# Patient Record
Sex: FEMALE | Race: WHITE | NOT HISPANIC OR LATINO | Employment: OTHER | URBAN - METROPOLITAN AREA
[De-identification: names, ages, dates, MRNs, and addresses within clinical notes are randomized per-mention and may not be internally consistent; named-entity substitution may affect disease eponyms.]

---

## 2017-08-03 ENCOUNTER — PATIENT OUTREACH (OUTPATIENT)
Dept: ADMINISTRATIVE | Facility: HOSPITAL | Age: 52
End: 2017-08-03

## 2023-06-21 ENCOUNTER — TELEPHONE (OUTPATIENT)
Dept: PODIATRY | Facility: CLINIC | Age: 58
End: 2023-06-21
Payer: COMMERCIAL

## 2023-06-21 NOTE — TELEPHONE ENCOUNTER
[facility] requesting callback  ----- Message from Cecille Bernardo sent at 6/21/2023  8:42 AM CDT -----  Contact: Cherie  Type:  Sooner Apoointment Request    Caller is requesting a sooner appointment.  Caller declined first available appointment listed below.  Caller will not accept being placed on the waitlist and is requesting a message be sent to doctor.  Name of Caller:Cherie  When is the first available appointment?everything has a time conflict on it  Symptoms:Foot pain  Would the patient rather a call back or a response via [facility]? Call back  Best Call Back Number:[phone]  Additional Information:     Thanks  LJ

## 2023-07-06 ENCOUNTER — OFFICE VISIT (OUTPATIENT)
Dept: PODIATRY | Facility: CLINIC | Age: 58
End: 2023-07-06
Payer: COMMERCIAL

## 2023-07-06 ENCOUNTER — HOSPITAL ENCOUNTER (OUTPATIENT)
Dept: RADIOLOGY | Facility: HOSPITAL | Age: 58
Discharge: HOME | End: 2023-07-06
Attending: PODIATRIST
Payer: COMMERCIAL

## 2023-07-06 VITALS — HEIGHT: 65 IN | WEIGHT: 192.69 LBS | BODY MASS INDEX: 32.1 KG/M2

## 2023-07-06 DIAGNOSIS — G57.92 PERIPHERAL NEURITIS OF LEFT FOOT: ICD-10-CM

## 2023-07-06 DIAGNOSIS — I83.93 ASYMPTOMATIC VARICOSE VEINS OF BOTH LOWER EXTREMITIES: ICD-10-CM

## 2023-07-06 DIAGNOSIS — M25.572 PAIN IN LEFT ANKLE AND JOINTS OF LEFT FOOT: ICD-10-CM

## 2023-07-06 DIAGNOSIS — M19.072 OSTEOARTHRITIS OF LEFT ANKLE OR FOOT: Primary | ICD-10-CM

## 2023-07-06 DIAGNOSIS — I87.2 VENOUS INSUFFICIENCY OF BOTH LOWER EXTREMITIES: ICD-10-CM

## 2023-07-06 DIAGNOSIS — M77.41 METATARSALGIA, RIGHT FOOT: ICD-10-CM

## 2023-07-06 DIAGNOSIS — M25.571 PAIN IN RIGHT ANKLE AND JOINTS OF RIGHT FOOT: ICD-10-CM

## 2023-07-06 DIAGNOSIS — G57.91 PERIPHERAL NEURITIS OF RIGHT FOOT: ICD-10-CM

## 2023-07-06 DIAGNOSIS — M77.42 METATARSALGIA, LEFT FOOT: ICD-10-CM

## 2023-07-06 DIAGNOSIS — Z79.01 ON CONTINUOUS ORAL ANTICOAGULATION: ICD-10-CM

## 2023-07-06 DIAGNOSIS — M19.071 OSTEOARTHRITIS OF RIGHT ANKLE OR FOOT: ICD-10-CM

## 2023-07-06 DIAGNOSIS — M19.072 OSTEOARTHRITIS OF LEFT ANKLE OR FOOT: ICD-10-CM

## 2023-07-06 PROCEDURE — 99204 PR OFFICE/OUTPT VISIT, NEW, LEVL IV, 45-59 MIN: ICD-10-PCS | Mod: S$GLB,,, | Performed by: PODIATRIST

## 2023-07-06 PROCEDURE — 3008F BODY MASS INDEX DOCD: CPT | Mod: CPTII,S$GLB,, | Performed by: PODIATRIST

## 2023-07-06 PROCEDURE — 3008F PR BODY MASS INDEX (BMI) DOCUMENTED: ICD-10-PCS | Mod: CPTII,S$GLB,, | Performed by: PODIATRIST

## 2023-07-06 PROCEDURE — 99999 PR PBB SHADOW E&M-EST. PATIENT-LVL III: ICD-10-PCS | Mod: PBBFAC,,, | Performed by: PODIATRIST

## 2023-07-06 PROCEDURE — 73630 XR FOOT COMPLETE 3 VIEW BILATERAL: ICD-10-PCS | Mod: 26,,, | Performed by: RADIOLOGY

## 2023-07-06 PROCEDURE — 73630 X-RAY EXAM OF FOOT: CPT | Mod: 26,,, | Performed by: RADIOLOGY

## 2023-07-06 PROCEDURE — 1160F PR REVIEW ALL MEDS BY PRESCRIBER/CLIN PHARMACIST DOCUMENTED: ICD-10-PCS | Mod: CPTII,S$GLB,, | Performed by: PODIATRIST

## 2023-07-06 PROCEDURE — 99204 OFFICE O/P NEW MOD 45 MIN: CPT | Mod: S$GLB,,, | Performed by: PODIATRIST

## 2023-07-06 PROCEDURE — 73630 X-RAY EXAM OF FOOT: CPT | Mod: TC,50

## 2023-07-06 PROCEDURE — 1160F RVW MEDS BY RX/DR IN RCRD: CPT | Mod: CPTII,S$GLB,, | Performed by: PODIATRIST

## 2023-07-06 PROCEDURE — 1159F PR MEDICATION LIST DOCUMENTED IN MEDICAL RECORD: ICD-10-PCS | Mod: CPTII,S$GLB,, | Performed by: PODIATRIST

## 2023-07-06 PROCEDURE — 99999 PR PBB SHADOW E&M-EST. PATIENT-LVL III: CPT | Mod: PBBFAC,,, | Performed by: PODIATRIST

## 2023-07-06 PROCEDURE — 1159F MED LIST DOCD IN RCRD: CPT | Mod: CPTII,S$GLB,, | Performed by: PODIATRIST

## 2023-07-06 RX ORDER — APIXABAN 5 MG/1
5 TABLET, FILM COATED ORAL 2 TIMES DAILY
COMMUNITY
Start: 2023-06-07

## 2023-07-06 RX ORDER — DEXTROAMPHETAMINE SACCHARATE, AMPHETAMINE ASPARTATE, DEXTROAMPHETAMINE SULFATE AND AMPHETAMINE SULFATE 3.75; 3.75; 3.75; 3.75 MG/1; MG/1; MG/1; MG/1
15 TABLET ORAL
COMMUNITY
Start: 2022-11-21

## 2023-07-06 RX ORDER — PREGABALIN 50 MG/1
50 CAPSULE ORAL 2 TIMES DAILY
Qty: 60 CAPSULE | Refills: 0 | Status: SHIPPED | OUTPATIENT
Start: 2023-07-06 | End: 2023-08-05

## 2023-07-06 NOTE — PROGRESS NOTES
Subjective:       Patient ID: Cherie Eduardo is a 57 y.o. female.    Chief Complaint: Foot Injury (Pt c/o BL foot pain 7/10 when walking,non-diabetic pt wears tennis shoes PCP Dr. Maxwell last seen 10-6-21) and Foot Pain      HPI: Cherie Eduardo presents to the clinic today for evaluation concerning stated moderate pains to the left and right foot/ankle at the midfoot and ankle. Patient states pains are approx. 7/10. Pains are described as achy and sharp. Pains have been present for duration of months/years. Patient states the pains are exacerbated with walking and standing and prolonged activities. States occasional NSAIDs. Trauma is not stated. Patient's Primary Care Provider is Chucky Herrera MD. States burning of the LE.     Review of patient's allergies indicates:   Allergen Reactions    Gabapentin      Rash       Past Medical History:   Diagnosis Date    CKD (chronic kidney disease) stage 3, GFR 30-59 ml/min     DVT (deep venous thrombosis)     left leg    DVT (deep venous thrombosis)     Hearing loss and ringing in ears     Hematoma of lower limb 01/15/2012    1/17/12 Surgery on Left Leg Hematoma by Dr. Aguilar at [facility]    History of blood clots     surgery for hematoma from blood clots in left leg    Hypertension     Orbital fracture     Renal failure, acute     Scoliosis     Was unable to walk due to condition..Surgery performed at [facility], [facility]. by Dr. Rik Wiseman.  10th rib removed for bone graft.Fusion of T-11 - T-12, T-12-L1, L1- L2, L2- L3, L3 - L4, L4- L5    Seizures     Sepsis     Shoulder contusion     Treated at [facility]  by Dr. Austyn Salter III,    Unspecified essential hypertension        Family History   Problem Relation Age of Onset    Heart failure Father     Cancer Mother        Social History     Socioeconomic History    Marital status:    Occupational History    Occupation: Retired   Tobacco Use    Smoking status: Former      "Types: Cigarettes    Smokeless tobacco: Never   Substance and Sexual Activity    Alcohol use: Yes     Comment: rarely    Drug use: No    Sexual activity: Yes     Partners: Male       Past Surgical History:   Procedure Laterality Date    BACK SURGERY      rubio for scoliosis    COLONOSCOPY      EYE SURGERY      NAYAN FILTER PLACEMENT         Review of Systems   Constitutional:  Negative for chills, fatigue and fever.   HENT:  Negative for hearing loss.    Eyes:  Negative for photophobia and visual disturbance.   Respiratory:  Negative for cough, chest tightness, shortness of breath and wheezing.    Cardiovascular:  Negative for chest pain and palpitations.   Gastrointestinal:  Negative for constipation, diarrhea, nausea and vomiting.   Endocrine: Negative for cold intolerance and heat intolerance.   Genitourinary:  Negative for flank pain.   Musculoskeletal:  Positive for arthralgias and gait problem. Negative for neck pain and neck stiffness.   Neurological:  Negative for light-headedness and headaches.   Psychiatric/Behavioral:  Negative for sleep disturbance.        Objective:   Ht 5' 4.75" (1.645 m)   Wt 87.4 kg (192 lb 10.9 oz)   BMI 32.31 kg/m²     Physical Exam    LOWER EXTREMITY PHYSICAL EXAMINATION    DERMATOLOGY: Skin is supple, dry and intact.     NEUROLOGY: Neuritis upon palpation of the IDCN atop the B/L foot/anterior ankle joint     ORTHOPEDIC: MMT is 5/5 on the B/L LE. Palpable midfoot DJD is noted, B/L LE. TN joint DJD is noted, RLE. Mildly antalgic gait is noted. Ankle ROM is smooth. Midfoot and hindfoot ROM is decreased. Tailor's bunion is noted, B/L. Metatarsalgia is noted, B/L.      Assessment:     1. Osteoarthritis of left ankle or foot    2. Osteoarthritis of right ankle or foot    3. Pain in right ankle and joints of right foot    4. Pain in left ankle and joints of left foot    5. On continuous oral anticoagulation    6. Metatarsalgia, left foot    7. Metatarsalgia, right foot    8. " Asymptomatic varicose veins of both lower extremities    9. Venous insufficiency of both lower extremities    10. Peripheral neuritis of left foot    11. Peripheral neuritis of right foot          Plan:     Osteoarthritis of left ankle or foot  -     X-Ray Foot Complete Bilateral; Future; Expected date: 07/06/2023    Osteoarthritis of right ankle or foot  -     X-Ray Foot Complete Bilateral; Future; Expected date: 07/06/2023    Pain in right ankle and joints of right foot    Pain in left ankle and joints of left foot    On continuous oral anticoagulation    Metatarsalgia, left foot    Metatarsalgia, right foot    Asymptomatic varicose veins of both lower extremities    Venous insufficiency of both lower extremities    Peripheral neuritis of left foot  -     pregabalin (LYRICA) 50 MG capsule; Take 1 capsule (50 mg total) by mouth 2 (two) times daily.  Dispense: 60 capsule; Refill: 0    Peripheral neuritis of right foot  -     pregabalin (LYRICA) 50 MG capsule; Take 1 capsule (50 mg total) by mouth 2 (two) times daily.  Dispense: 60 capsule; Refill: 0      Thorough discussion is had with the patient today, concerning the diagnosis, its etiology, and the treatment algorithm at present.     Pleaser obtain XR Foot.    Is on AC, so not NSAID.    Continue marijuana gummies and/or Tylenol for relief.    Hypertrophic skin formation, as outlined within the examination portion of this note, is surgically debrided with sharp #10/#15 blade, to alleviate discomfort with weight bearing and ambulation, and to lessen the possibility of skin complications, e.g., ulceration due to pressure. No ulceration(s) is are noted with/post debridement. The lesion is completed healed and resolved. No evidence of infection.     Prescription is written for Orthotist evaluation at [facility], [address] [place] [id number], for lower extremity orthotic(s) management and/or shoe gear management.    Patient is educated as to the  use and the effectiveness of Lyrica. These being, but not limited to possible infection, ataxia, blurred vision, constipation, diplopia, dizziness, drowsiness, fatigue, headache, peripheral edema, tremor, weight gain, visual field loss, accidental injury, and xerostomia. Other side effects may include, abnormal gait, abnormality in thinking, amnesia, arthralgia, asthenia, cognitive dysfunction, confusion, edema, neuropathy, sinusitis, speech disturbance, vertigo, visual disturbance, myasthenia, amblyopia, increased appetite, twitching and etc...        Future Appointments   Date Time Provider Department Center   7/6/2023  2:00 PM JAY CASTRO-DR [facility] [place]

## 2025-02-24 ENCOUNTER — TELEPHONE (OUTPATIENT)
Dept: NEUROLOGY | Facility: CLINIC | Age: 60
End: 2025-02-24
Payer: COMMERCIAL

## 2025-02-24 NOTE — TELEPHONE ENCOUNTER
----- Message from Mena sent at 2/24/2025  3:46 PM CST -----  Contact: ELLIOT MORGAN [[id number]]  .Type:  Patient Requesting CallWho Called:ELLIOT MORGAN [[id number]]Does the patient know what this is regarding?: questions regarding scheduling, does DR see neuropathy patients Would the patient rather a call back or a response via [facility]? callBest Call Back Number: .[phone] (home) Additional Information: